# Patient Record
Sex: MALE | Race: WHITE | NOT HISPANIC OR LATINO | Employment: UNEMPLOYED | URBAN - METROPOLITAN AREA
[De-identification: names, ages, dates, MRNs, and addresses within clinical notes are randomized per-mention and may not be internally consistent; named-entity substitution may affect disease eponyms.]

---

## 2024-08-20 ENCOUNTER — HOSPITAL ENCOUNTER (EMERGENCY)
Facility: HOSPITAL | Age: 44
Discharge: HOME | End: 2024-08-21
Attending: EMERGENCY MEDICINE

## 2024-08-20 DIAGNOSIS — F10.929: Primary | ICD-10-CM

## 2024-08-20 LAB
ALBUMIN SERPL BCP-MCNC: 4.4 G/DL (ref 3.5–5.2)
ALP SERPL-CCNC: 77 U/L (ref 55–135)
ALT SERPL W/O P-5'-P-CCNC: 30 U/L (ref 10–44)
AMPHET+METHAMPHET UR QL: NEGATIVE
ANION GAP SERPL CALC-SCNC: 10 MMOL/L (ref 8–16)
APAP SERPL-MCNC: 0.3 UG/ML (ref 10–20)
AST SERPL-CCNC: 35 U/L (ref 10–40)
BARBITURATES UR QL SCN>200 NG/ML: NEGATIVE
BASOPHILS # BLD AUTO: 0.1 K/UL (ref 0–0.2)
BASOPHILS NFR BLD: 1 % (ref 0–1.9)
BENZODIAZ UR QL SCN>200 NG/ML: NEGATIVE
BILIRUB SERPL-MCNC: 0.4 MG/DL (ref 0.1–1)
BILIRUB UR QL STRIP: NEGATIVE
BUN SERPL-MCNC: 11 MG/DL (ref 6–20)
BZE UR QL SCN: NEGATIVE
CALCIUM SERPL-MCNC: 9.2 MG/DL (ref 8.7–10.5)
CANNABINOIDS UR QL SCN: NEGATIVE
CHLORIDE SERPL-SCNC: 107 MMOL/L (ref 95–110)
CLARITY UR: CLEAR
CO2 SERPL-SCNC: 24 MMOL/L (ref 23–29)
COLOR UR: COLORLESS
CREAT SERPL-MCNC: 0.8 MG/DL (ref 0.5–1.4)
CREAT UR-MCNC: 227 MG/DL (ref 23–375)
DIFFERENTIAL METHOD BLD: ABNORMAL
EOSINOPHIL # BLD AUTO: 0.1 K/UL (ref 0–0.5)
EOSINOPHIL NFR BLD: 0.6 % (ref 0–8)
ERYTHROCYTE [DISTWIDTH] IN BLOOD BY AUTOMATED COUNT: 13 % (ref 11.5–14.5)
EST. GFR  (NO RACE VARIABLE): >60 ML/MIN/1.73 M^2
ETHANOL SERPL-MCNC: 188 MG/DL
ETHANOL SERPL-MCNC: 306 MG/DL
GLUCOSE SERPL-MCNC: 98 MG/DL (ref 70–110)
GLUCOSE UR QL STRIP: NEGATIVE
HCT VFR BLD AUTO: 45.6 % (ref 40–54)
HGB BLD-MCNC: 16.1 G/DL (ref 14–18)
HGB UR QL STRIP: NEGATIVE
IMM GRANULOCYTES # BLD AUTO: 0.07 K/UL (ref 0–0.04)
IMM GRANULOCYTES NFR BLD AUTO: 0.7 % (ref 0–0.5)
KETONES UR QL STRIP: NEGATIVE
LEUKOCYTE ESTERASE UR QL STRIP: NEGATIVE
LYMPHOCYTES # BLD AUTO: 4.1 K/UL (ref 1–4.8)
LYMPHOCYTES NFR BLD: 39.9 % (ref 18–48)
MCH RBC QN AUTO: 32.7 PG (ref 27–31)
MCHC RBC AUTO-ENTMCNC: 35.3 G/DL (ref 32–36)
MCV RBC AUTO: 93 FL (ref 82–98)
MONOCYTES # BLD AUTO: 0.4 K/UL (ref 0.3–1)
MONOCYTES NFR BLD: 4 % (ref 4–15)
NEUTROPHILS # BLD AUTO: 5.5 K/UL (ref 1.8–7.7)
NEUTROPHILS NFR BLD: 53.8 % (ref 38–73)
NITRITE UR QL STRIP: NEGATIVE
NRBC BLD-RTO: 0 /100 WBC
OPIATES UR QL SCN: NEGATIVE
PCP UR QL SCN>25 NG/ML: NEGATIVE
PH UR STRIP: 7 [PH] (ref 5–8)
PLATELET # BLD AUTO: 260 K/UL (ref 150–450)
PMV BLD AUTO: 9.5 FL (ref 9.2–12.9)
POTASSIUM SERPL-SCNC: 4.2 MMOL/L (ref 3.5–5.1)
PROT SERPL-MCNC: 8 G/DL (ref 6–8.4)
PROT UR QL STRIP: NEGATIVE
RBC # BLD AUTO: 4.93 M/UL (ref 4.6–6.2)
SALICYLATES SERPL-MCNC: <1.5 MG/DL (ref 15–30)
SODIUM SERPL-SCNC: 141 MMOL/L (ref 136–145)
SP GR UR STRIP: <1.005 (ref 1–1.03)
TOXICOLOGY INFORMATION: NORMAL
TSH SERPL DL<=0.005 MIU/L-ACNC: 1.74 UIU/ML (ref 0.34–5.6)
URN SPEC COLLECT METH UR: ABNORMAL
UROBILINOGEN UR STRIP-ACNC: NEGATIVE EU/DL
WBC # BLD AUTO: 10.23 K/UL (ref 3.9–12.7)

## 2024-08-20 PROCEDURE — 25000003 PHARM REV CODE 250: Performed by: EMERGENCY MEDICINE

## 2024-08-20 PROCEDURE — 85025 COMPLETE CBC W/AUTO DIFF WBC: CPT | Performed by: EMERGENCY MEDICINE

## 2024-08-20 PROCEDURE — 82077 ASSAY SPEC XCP UR&BREATH IA: CPT | Performed by: EMERGENCY MEDICINE

## 2024-08-20 PROCEDURE — G0425 INPT/ED TELECONSULT30: HCPCS | Mod: GT,,, | Performed by: PSYCHIATRY & NEUROLOGY

## 2024-08-20 PROCEDURE — 80053 COMPREHEN METABOLIC PANEL: CPT | Performed by: EMERGENCY MEDICINE

## 2024-08-20 PROCEDURE — S4991 NICOTINE PATCH NONLEGEND: HCPCS | Performed by: EMERGENCY MEDICINE

## 2024-08-20 PROCEDURE — 80179 DRUG ASSAY SALICYLATE: CPT | Performed by: EMERGENCY MEDICINE

## 2024-08-20 PROCEDURE — 84443 ASSAY THYROID STIM HORMONE: CPT | Performed by: EMERGENCY MEDICINE

## 2024-08-20 PROCEDURE — 80143 DRUG ASSAY ACETAMINOPHEN: CPT | Performed by: EMERGENCY MEDICINE

## 2024-08-20 PROCEDURE — 99283 EMERGENCY DEPT VISIT LOW MDM: CPT

## 2024-08-20 PROCEDURE — 80307 DRUG TEST PRSMV CHEM ANLYZR: CPT | Performed by: EMERGENCY MEDICINE

## 2024-08-20 PROCEDURE — 81003 URINALYSIS AUTO W/O SCOPE: CPT | Mod: 59 | Performed by: EMERGENCY MEDICINE

## 2024-08-20 RX ORDER — IBUPROFEN 200 MG
1 TABLET ORAL
Status: DISCONTINUED | OUTPATIENT
Start: 2024-08-20 | End: 2024-08-21 | Stop reason: HOSPADM

## 2024-08-20 RX ADMIN — NICOTINE 1 PATCH: 21 PATCH, EXTENDED RELEASE TRANSDERMAL at 11:08

## 2024-08-20 NOTE — ED NOTES
Pt pacing in room. RR even and unlabored. Call light in reach, sitter at monitor. Safety needs met.

## 2024-08-20 NOTE — ED NOTES
Pt lying in bed, RR even and unlabored. NAD. Sitter at monitor. Call light in reach. Safety needs met. Pt speaking with tele psychologist.

## 2024-08-20 NOTE — ED NOTES
Patient requesting to leave. Pt reminded discharge is at the discretion of the provider and cannot be discharged at this time due to circumstances surrounding arrival. Pt verbalized understanding. Pt lying in bed, RR even and unlabored. NAD. Sitter at monitor. Call light in reach and explained to pt, pt verbalized understanding. Safety needs met.

## 2024-08-20 NOTE — ED NOTES
Pt requesting to be discharged. Pt reminded he cannot be discharged at this time and we are waiting for the psychiatrist to speak with him. Pt unwilling to accept this.

## 2024-08-21 VITALS
WEIGHT: 280 LBS | BODY MASS INDEX: 42.44 KG/M2 | HEIGHT: 68 IN | HEART RATE: 88 BPM | DIASTOLIC BLOOD PRESSURE: 72 MMHG | RESPIRATION RATE: 16 BRPM | SYSTOLIC BLOOD PRESSURE: 138 MMHG | OXYGEN SATURATION: 97 % | TEMPERATURE: 98 F

## 2024-08-21 LAB — ETHANOL SERPL-MCNC: 105 MG/DL

## 2024-08-21 PROCEDURE — 82077 ASSAY SPEC XCP UR&BREATH IA: CPT | Performed by: EMERGENCY MEDICINE

## 2024-08-21 NOTE — CONSULTS
"[facility]  Psychiatry  Telepsychiatry Consult Note    Please see previous notes:    Patient agreeable to consultation via telepsychiatry.    Tele-Consultation from Psychiatry started: 8/20/2024 at 10:00pm  The chief complaint leading to psychiatric consultation is: aggression, etoh  This consultation was requested by Dr Negrete, the Emergency Department attending physician.  The location of the consulting psychiatrist is  [place] .  The patient location is  [facility] EMERGENCY DEPARTMENT   The patient arrived at the ED at: [facility]    Also present with the patient at the time of the consultation: nobody    Patient Identification:   Jeffery Bah is a 44 y.o. male.    Patient information was obtained from patient and past medical records.  Patient presented involuntarily to the Emergency Department     Consults  Teleconsult Time Documentation  Subjective:     History of Present Illness:  43yo male brought into ED intoxicated, COLUMBA was 306 after drinking to excess at home and getting aggressive with mother.     Per ED note-  "  Patient presents emergency department with reported agitation aggressive behavior at home patient's mother contacted 911 reports son has been drinking alcohol today became aggressive towards her patient was transported to emergency department for further evaluation patient does admit to drinking alcohol today states he does not recall getting aggressive with his mother he denies any hallucinations denies any suicidal ideations no homicidal ideations states he does not drink on a daily basis states he did drink a lot today   "  On interview, patient reports he does not normally drink, had a day off, he "was bored" was drinking 99 bananas on his day off work. Patient reports he does not recall why he was brought to the hospital after the police work him up while at home. He denied recalling be aggressive towards his step mother but did recall possibly arguing with her. He could not say " "about what. Patient denied SI and HI. Denied depression or anxiety. Denied psychotic sx. Denied manic sx.  Denied PTSD sx  Denied hopelessness, has family to live for  This is  the extent of patients complaints at this time  12 pt ros was negative aside from sx noted above    [phone]- Jolie- BALTAZAR called his step mother three at number he provided for collateral. She did not answer.   Patient has been calm and without aggression in the ED.      Psychiatric History:   Denied past psych hx  Previous Psychiatric Hospitalizations: No   Previous Medication Trials: No   Previous Suicide Attempts: no   History of Violence: no  History of Depression: no  History of Erika: no  History of Auditory/Visual Hallucination no  History of Delusions: no  Outpatient psychiatrist (current & past): No    Substance Abuse History:  Tobacco:No  Alcohol: none in 1.5months, denied binge drinking. Reports he normally has "1-2 drinks" when he drinks.  Illicit Substances:No  Detox/Rehab: No    Legal History: Past charges/incarcerations: No     Family Psychiatric History: denied      Social History:  Moved to [place] in 2024 to live with his step mother.  Father is  4 years ago. Mother lives with in [place].  2 adult children.  Denied access to firearms    Psychiatric Mental Status Exam:  Arousal: alert  Sensorium/Orientation: oriented to grossly intact  Behavior/Cooperation: normal, cooperative   Speech: normal tone, normal rate, normal pitch, normal volume  Language: not tested  Mood: " fine "   Affect: appropriate  Thought Process: normal and logical  Thought Content:   Auditory hallucinations: NO  Visual hallucinations: NO  Paranoia: NO  Delusions:  NO  Suicidal ideation: NO  Homicidal ideation: NO  Attention/Concentration:  intact  Memory:    Recent:  Intact   Remote: Intact   3/3 immediate, 3/3 at 5 min  Fund of Knowledge: Aware of current events   Abstract reasoning: similarities were abstract  Insight: limited awareness of " illness  Judgment: limited      Past Medical History: History reviewed. No pertinent past medical history.   Laboratory Data:   Labs Reviewed   CBC W/ AUTO DIFFERENTIAL - Abnormal       Result Value    WBC 10.23      RBC 4.93      Hemoglobin 16.1      Hematocrit 45.6      MCV 93      MCH 32.7 (*)     MCHC 35.3      RDW 13.0      Platelets 260      MPV 9.5      Immature Granulocytes 0.7 (*)     Gran # (ANC) 5.5      Immature Grans (Abs) 0.07 (*)     Lymph # 4.1      Mono # 0.4      Eos # 0.1      Baso # 0.10      nRBC 0      Gran % 53.8      Lymph % 39.9      Mono % 4.0      Eosinophil % 0.6      Basophil % 1.0      Differential Method Automated     URINALYSIS, REFLEX TO URINE CULTURE - Abnormal    Specimen UA Urine, Clean Catch      Color, UA Colorless (*)     Appearance, UA Clear      pH, UA 7.0      Specific Gravity, UA <1.005 (*)     Protein, UA Negative      Glucose, UA Negative      Ketones, UA Negative      Bilirubin (UA) Negative      Occult Blood UA Negative      Nitrite, UA Negative      Urobilinogen, UA Negative      Leukocytes, UA Negative      Narrative:     Specimen Source->Urine   ALCOHOL,MEDICAL (ETHANOL) - Abnormal    Alcohol, Serum 306 (*)    ACETAMINOPHEN LEVEL - Abnormal    Acetaminophen (Tylenol), Serum 0.3 (*)    SALICYLATE LEVEL - Abnormal    Salicylate Lvl <1.5 (*)    COMPREHENSIVE METABOLIC PANEL    Sodium 141      Potassium 4.2      Chloride 107      CO2 24      Glucose 98      BUN 11      Creatinine 0.8      Calcium 9.2      Total Protein 8.0      Albumin 4.4      Total Bilirubin 0.4      Alkaline Phosphatase 77      AST 35      ALT 30      eGFR >60.0      Anion Gap 10     TSH    TSH 1.742     DRUG SCREEN PANEL, URINE EMERGENCY    Benzodiazepines Negative      Cocaine (Metab.) Negative      Opiate Scrn, Ur Negative      Barbiturate Screen, Ur Negative      Amphetamine Screen, Ur Negative      THC Negative      Phencyclidine Negative      Creatinine, Urine 227.0      Toxicology Information  SEE COMMENT     ALCOHOL,MEDICAL (ETHANOL)         Allergies:   Review of patient's allergies indicates:  No Known Allergies    Medications in ER: Medications - No data to display    Medications at home: reviewed with patient and in MAR    No new subjective & objective note has been filed under this hospital service since the last note was generated.      Assessment - Diagnosis - Goals:     Diagnosis/Impression:  Alcohol intoxication  R/o alcohol use disorder    Modified SAD PERSONS Scale     Suicide Risk Assessment (if applicable)-  A: Access to lethal means ? no  Risk Factors:  S: Male sex ? 1  A: Age 15-25 or 59+ years ?0  D: Depression or hopelessness ?0  P: Previous suicidal attempts or psychiatric care ?0  E: Excessive ethanol or drug use ? 1  R: Rational thinking loss (psychotic or organic illness) ?denied  S: Single,  or  ?0  O: Organized or serious attempt ? 0  N: No social support ? 0  S: Stated future intent (determined to repeat or ambivalent) ? 0  Protective Factors:  C: Contracts for safety ? yes  H: Hopeful for the future ? yes  O: Oriented to the future ? yes   I:  Intent- denies ?yes has protective factor  R: Reasons not to attempt (namely, impact on loved ones and Cheondoism) ?family    Rec:  At this time based on data that was available to me at the time of consultation, I do not believe there is requisite data to PEC patient/hold patient against his will.  Patient does not appear to have SI/HI nor is he gravely disabled at this time now that he has sobered up. Patient declined voluntary psychiatric admission which was discussed as part of informed consent.   Safety planning. Patient contracts for safety.   Informed consent provided   Please provide patient with area mental health and addiction resources       Time with patient, coordinating care: 38min      More than 50% of the time was spent counseling/coordinating care    Consulting clinician was informed of the encounter and  consult note.    Consultation ended: 8/20/2024 at 10:41pm    Ricardo Gil MD  Psychiatry  [facility]

## 2024-08-21 NOTE — ED NOTES
Attempted to call stepmother Gerson) at [phone] for a responsible ride home, no answer at this time

## 2024-08-21 NOTE — ED PROVIDER NOTES
"Encounter Date: 8/20/2024       History     Chief Complaint   Patient presents with    Mental Health Problem     Patient presents emergency department with reported agitation aggressive behavior at home patient's mother contacted 911 reports son has been drinking alcohol today became aggressive towards her patient was transported to emergency department for further evaluation patient does admit to drinking alcohol today states he does not recall getting aggressive with his mother he denies any hallucinations denies any suicidal ideations no homicidal ideations states he does not drink on a daily basis states he did drink a lot today        Review of patient's allergies indicates:  No Known Allergies  History reviewed. No pertinent past medical history.  History reviewed. No pertinent surgical history.  No family history on file.  Social History     Tobacco Use    Smoking status: Every Day     Types: Cigarettes   Substance Use Topics    Alcohol use: Yes     Comment: "more than I should"     Review of Systems   Psychiatric/Behavioral:  Positive for agitation and behavioral problems. Negative for dysphoric mood, hallucinations and suicidal ideas. The patient is not nervous/anxious.    All other systems reviewed and are negative.      Physical Exam     Initial Vitals [08/20/24 1323]   BP Pulse Resp Temp SpO2   (!) 140/82 100 18 98.1 °F (36.7 °C) 95 %      MAP       --         Physical Exam    Constitutional: He appears well-developed and well-nourished. No distress.   HENT:   Head: Normocephalic and atraumatic.   Right Ear: External ear normal.   Left Ear: External ear normal.   Mouth/Throat: Oropharynx is clear and moist.   Eyes: Pupils are equal, round, and reactive to light.   Neck: Neck supple.   Normal range of motion.  Cardiovascular:  Normal rate, regular rhythm, S1 normal, S2 normal and intact distal pulses.           Abdominal: Abdomen is soft. Bowel sounds are normal. There is no abdominal tenderness. "   Musculoskeletal:         General: Normal range of motion.      Cervical back: Normal range of motion and neck supple.     Neurological: He is alert and oriented to person, place, and time. GCS eye subscore is 4. GCS verbal subscore is 5. GCS motor subscore is 6.   Skin: Skin is warm and dry. No rash noted.   Psychiatric: He has a normal mood and affect. His behavior is normal.         ED Course   Procedures  Labs Reviewed   CBC W/ AUTO DIFFERENTIAL - Abnormal       Result Value    WBC 10.23      RBC 4.93      Hemoglobin 16.1      Hematocrit 45.6      MCV 93      MCH 32.7 (*)     MCHC 35.3      RDW 13.0      Platelets 260      MPV 9.5      Immature Granulocytes 0.7 (*)     Gran # (ANC) 5.5      Immature Grans (Abs) 0.07 (*)     Lymph # 4.1      Mono # 0.4      Eos # 0.1      Baso # 0.10      nRBC 0      Gran % 53.8      Lymph % 39.9      Mono % 4.0      Eosinophil % 0.6      Basophil % 1.0      Differential Method Automated     URINALYSIS, REFLEX TO URINE CULTURE - Abnormal    Specimen UA Urine, Clean Catch      Color, UA Colorless (*)     Appearance, UA Clear      pH, UA 7.0      Specific Gravity, UA <1.005 (*)     Protein, UA Negative      Glucose, UA Negative      Ketones, UA Negative      Bilirubin (UA) Negative      Occult Blood UA Negative      Nitrite, UA Negative      Urobilinogen, UA Negative      Leukocytes, UA Negative      Narrative:     Specimen Source->Urine   ALCOHOL,MEDICAL (ETHANOL) - Abnormal    Alcohol, Serum 306 (*)    ACETAMINOPHEN LEVEL - Abnormal    Acetaminophen (Tylenol), Serum 0.3 (*)    SALICYLATE LEVEL - Abnormal    Salicylate Lvl <1.5 (*)    ALCOHOL,MEDICAL (ETHANOL) - Abnormal    Alcohol, Serum 188 (*)    ALCOHOL,MEDICAL (ETHANOL) - Abnormal    Alcohol, Serum 105 (*)    COMPREHENSIVE METABOLIC PANEL    Sodium 141      Potassium 4.2      Chloride 107      CO2 24      Glucose 98      BUN 11      Creatinine 0.8      Calcium 9.2      Total Protein 8.0      Albumin 4.4      Total Bilirubin  0.4      Alkaline Phosphatase 77      AST 35      ALT 30      eGFR >60.0      Anion Gap 10     TSH    TSH 1.742     DRUG SCREEN PANEL, URINE EMERGENCY    Benzodiazepines Negative      Cocaine (Metab.) Negative      Opiate Scrn, Ur Negative      Barbiturate Screen, Ur Negative      Amphetamine Screen, Ur Negative      THC Negative      Phencyclidine Negative      Creatinine, Urine 227.0      Toxicology Information SEE COMMENT            Imaging Results    None          Medications - No data to display  Medical Decision Making  Laboratory evaluation reviewed events patient's blood alcohol level was over 300 he was seen by tele psychiatry briefly who aborted their assessment given his alcohol level await further sobriety for definitive disposition    Amount and/or Complexity of Data Reviewed  Labs: ordered. Decision-making details documented in ED Course.                                      Clinical Impression:  Final diagnoses:  [F10.929] Acute alcoholic intoxication with complication, continuous drinking behavior (Primary)          ED Disposition Condition    Discharge Stable          ED Prescriptions    None       Follow-up Information    None          Josue Negrete MD  08/22/24 [id number]

## 2024-08-21 NOTE — ED NOTES
Pt notified of need for urine recollect. Lab notified ED the urine sample was water. Security notified to turn water off in room.

## 2024-08-21 NOTE — ED NOTES
Pt requesting phone call and d/c. Pt informed he cannot be d/c due to PEC status. PEC process explained to patient again. Security notified pt would like to use the restroom.

## 2024-08-21 NOTE — ED NOTES
Pt pacing in room. RR even and unlabored. Call light in reach. Sitter at monitor. Safety needs met.

## 2024-08-21 NOTE — ED NOTES
Urine recollect sent to lab. Pt notified he cannot leave at this time. Pt verbalized understanding. Pt lying in bed, RR even and unlabored. NAD. Sitter at monitor. Call light in reach. Safety needs met.